# Patient Record
Sex: FEMALE | Race: WHITE | HISPANIC OR LATINO | Employment: UNEMPLOYED | URBAN - METROPOLITAN AREA
[De-identification: names, ages, dates, MRNs, and addresses within clinical notes are randomized per-mention and may not be internally consistent; named-entity substitution may affect disease eponyms.]

---
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
## 2025-03-06 ENCOUNTER — HOSPITAL ENCOUNTER (EMERGENCY)
Facility: CLINIC | Age: 20
Discharge: HOME | End: 2025-03-07
Attending: EMERGENCY MEDICINE | Admitting: EMERGENCY MEDICINE
Payer: COMMERCIAL

## 2025-03-06 DIAGNOSIS — M25.522 LEFT ELBOW PAIN: ICD-10-CM

## 2025-03-06 PROCEDURE — 99283 EMERGENCY DEPT VISIT LOW MDM: CPT | Performed by: EMERGENCY MEDICINE

## 2025-03-06 PROCEDURE — 99284 EMERGENCY DEPT VISIT MOD MDM: CPT | Performed by: EMERGENCY MEDICINE

## 2025-03-06 ASSESSMENT — ACTIVITIES OF DAILY LIVING (ADL): ADLS_ACUITY_SCORE: 41

## 2025-03-06 NOTE — LETTER
March 7, 2025      To Whom It May Concern:      Miryam Whitfield was seen in our Emergency Department today, 03/07/25.  I expect her condition to improve over the next 3-5 days.  She may return to work/school when improved.    Sincerely,        Bella Womack MD  Electronically signed

## 2025-03-07 ENCOUNTER — APPOINTMENT (OUTPATIENT)
Dept: GENERAL RADIOLOGY | Facility: CLINIC | Age: 20
End: 2025-03-07
Attending: EMERGENCY MEDICINE
Payer: COMMERCIAL

## 2025-03-07 VITALS
HEIGHT: 61 IN | TEMPERATURE: 97.6 F | BODY MASS INDEX: 19.26 KG/M2 | RESPIRATION RATE: 16 BRPM | SYSTOLIC BLOOD PRESSURE: 121 MMHG | HEART RATE: 71 BPM | WEIGHT: 102 LBS | DIASTOLIC BLOOD PRESSURE: 77 MMHG | OXYGEN SATURATION: 99 %

## 2025-03-07 LAB — HCG UR QL: NEGATIVE

## 2025-03-07 PROCEDURE — 81025 URINE PREGNANCY TEST: CPT | Performed by: EMERGENCY MEDICINE

## 2025-03-07 PROCEDURE — 36415 COLL VENOUS BLD VENIPUNCTURE: CPT | Performed by: EMERGENCY MEDICINE

## 2025-03-07 PROCEDURE — 73080 X-RAY EXAM OF ELBOW: CPT | Mod: LT

## 2025-03-07 PROCEDURE — 250N000013 HC RX MED GY IP 250 OP 250 PS 637: Performed by: EMERGENCY MEDICINE

## 2025-03-07 RX ORDER — IBUPROFEN 600 MG/1
600 TABLET, FILM COATED ORAL ONCE
Status: COMPLETED | OUTPATIENT
Start: 2025-03-07 | End: 2025-03-07

## 2025-03-07 RX ADMIN — IBUPROFEN 600 MG: 600 TABLET, FILM COATED ORAL at 01:56

## 2025-03-07 ASSESSMENT — ACTIVITIES OF DAILY LIVING (ADL)
ADLS_ACUITY_SCORE: 41
ADLS_ACUITY_SCORE: 41

## 2025-03-07 NOTE — ED TRIAGE NOTES
Pt slipped on ice half an hour ago and hit left elbow on the ground, is concerned its broken. Elbow area is swollen, some numbness and tingling in left arm.

## 2025-03-07 NOTE — ED PROVIDER NOTES
"    [facility] EMERGENCY DEPARTMENT ([facility])    3/06/25          History     Chief Complaint   Patient presents with    Arm Pain     HPI  Miryam Whitfield is a 20 year old female who with PMH of abdominal pain presents to the ED due to arm pain.    As per triage note,  Patient slipped on ice half an hour ago and hit left elbow on the ground; is concerned its broken. Elbow area is swollen, some numbness and tingling in left arm.       Patient states that she has left arm pain and it has been swollen.  She can move her fingers but her fingers feel numb.  Also, she has pain around her elbow.  Patient is not taking anything for her pain.    Past Medical History  No past medical history on file.  No past surgical history on file.  ondansetron (ZOFRAN ODT) 4 MG ODT tab      No Known Allergies  Family History  No family history on file.  Social History       Past medical history, past surgical history, medications, allergies, family history, and social history were reviewed with the patient. No additional pertinent items.   A complete review of systems was performed with pertinent positives and negatives noted in the HPI, and all other systems negative.    Physical Exam   BP: 108/72  Pulse: 80  Temp: 98.4  F (36.9  C)  Resp: 16  Height: 154.9 cm (5' 1\")  Weight: 46.3 kg (102 lb)  SpO2: 98 %  Physical Exam  General: Afebrile, in distress 2/2 to pain  HEENT: Normocephalic, atraumatic, conjunctivae normal. MMM  Neck: non-tender, supple  Cardio: regular rate. regular rhythm   Resp: Normal work of breathing, no respiratory distress, lungs clear bilaterally, no wheezing, rhonchi, rales  Chest/Back: no visual signs of trauma, no CVA tenderness   Abdomen: soft, non distension, no tenderness, no peritoneal signs   Neuro: alert and fully oriented. CN II-XII grossly intact. Grossly normal strength and sensation in all extremities.   MSK: Left elbow with diffuse TTP, no obvious deformities, ecchymosis, swelling, " patient with limited range of motion secondary to pain, distally neurovascular intact with radial pulse present bilaterally, cap refill less than 3 seconds  Integumentary/Skin: no rash visualized, normal color  Psych: normal affect, normal behavior      ED Course, Procedures, & Data      12:39 AM  The patient was seen and examined by Bella Womack MD.  in Room [facility].    Procedures       Results for orders placed or performed during the hospital encounter of 03/06/25   Elbow  XR, G/E 3 views, left     Status: None    Narrative    EXAM: XR ELBOW LEFT G/E 3 VIEWS  LOCATION: [facility]  DATE: 3/7/2025    INDICATION: Pain.  COMPARISON: None.      Impression    IMPRESSION: Normal joint spaces and alignment. No fracture or joint effusion.   HCG qualitative urine     Status: Normal   Result Value Ref Range    hCG Urine Qualitative Negative Negative     Medications   ibuprofen (ADVIL/MOTRIN) tablet 600 mg (600 mg Oral $Given 3/7/25 0156)     Labs Ordered and Resulted from Time of ED Arrival to Time of ED Departure   HCG QUALITATIVE URINE - Normal       Result Value    hCG Urine Qualitative Negative       Elbow  XR, G/E 3 views, left   Final Result   IMPRESSION: Normal joint spaces and alignment. No fracture or joint effusion.             Critical care was not performed.     Medical Decision Making  The patient's presentation was of low complexity (an acute and uncomplicated illness or injury).    The patient's evaluation involved:  an assessment requiring an independent historian (friend)  ordering and/or review of 1 test(s) in this encounter (see separate area of note for details)  independent interpretation of testing performed by another health professional (x-ray)    The patient's management necessitated moderate risk (prescription drug management including medications given in the ED).    Assessment & Plan    Miryam Whitfield is a 20 year old female who  with PMH of abdominal pain presents to the ED for left elbow pain after fall.  Patient was treated with ice, ibuprofen.  I personally reviewed and interpreted x-rays which demonstrates no acute fracture or dislocation.  Suspect likely contusion and recommend continued supportive care with ice, weightbearing as tolerated, Tylenol, ibuprofen.  Encourage close outpatient follow-up if no improvement of symptoms.  Return precautions discussed.  Patient understands and agrees to plan.  I have reviewed the nursing notes. I have reviewed the findings, diagnosis, plan and need for follow up with the patient.    Discharge Medication List as of 3/7/2025  1:52 AM          Final diagnoses:   Left elbow pain   I, BEE SHAY, am serving as a trained medical scribe to document services personally performed by Bella Womack MD, based on the provider's statements to me.     I, Bella Womack MD, was physically present and have reviewed and verified the accuracy of this note documented by BEE SHAY.     Bella Womack MD.   [facility] EMERGENCY DEPARTMENT  3/6/2025     Bella Womack MD  03/07/25 0214

## 2025-03-07 NOTE — ED NOTES
Patient reports falling on the ice outside and landed on elbow and then buttocks, patient report she did not hit her head.

## 2025-03-07 NOTE — DISCHARGE INSTRUCTIONS
Please follow-up with your primary care provider in the next 3 to 5 days for further evaluation and follow-up.  Please call to schedule an appointment.  Please ice your left elbow.  Please alternate taking Tylenol 1000 mg and ibuprofen 600 mg as needed for pain.  Please bear weight and use your left upper extremity as tolerated. Return to the emergency department for any worsening symptoms.